# Patient Record
Sex: FEMALE | Race: WHITE
[De-identification: names, ages, dates, MRNs, and addresses within clinical notes are randomized per-mention and may not be internally consistent; named-entity substitution may affect disease eponyms.]

---

## 2017-09-05 ENCOUNTER — HOSPITAL ENCOUNTER (EMERGENCY)
Dept: HOSPITAL 74 - JER | Age: 42
LOS: 1 days | Discharge: HOME | End: 2017-09-06
Payer: COMMERCIAL

## 2017-09-05 VITALS — TEMPERATURE: 98.5 F

## 2017-09-05 VITALS — BODY MASS INDEX: 27.4 KG/M2

## 2017-09-05 DIAGNOSIS — I10: ICD-10-CM

## 2017-09-05 DIAGNOSIS — E11.65: Primary | ICD-10-CM

## 2017-09-05 DIAGNOSIS — Z79.84: ICD-10-CM

## 2017-09-05 LAB
APPEARANCE UR: CLEAR
BASOPHILS # BLD: 0.9 % (ref 0–2)
BILIRUB UR STRIP.AUTO-MCNC: NEGATIVE MG/DL
COLOR UR: (no result)
DEPRECATED RDW RBC AUTO: 12.3 % (ref 11.6–15.6)
EOSINOPHIL # BLD: 1.1 % (ref 0–4.5)
INR BLD: 0.94 (ref 0.82–1.09)
KETONES UR QL STRIP: NEGATIVE
LEUKOCYTE ESTERASE UR QL STRIP.AUTO: NEGATIVE
MCH RBC QN AUTO: 30.3 PG (ref 25.7–33.7)
MCHC RBC AUTO-ENTMCNC: 34.9 G/DL (ref 32–36)
MCV RBC: 87 FL (ref 80–96)
NEUTROPHILS # BLD: 55.5 % (ref 42.8–82.8)
NITRITE UR QL STRIP: NEGATIVE
PH UR: 7 [PH] (ref 5–8)
PLATELET # BLD AUTO: 324 K/MM3 (ref 134–434)
PMV BLD: 8.6 FL (ref 7.5–11.1)
PROT UR QL STRIP: (no result)
PROT UR QL STRIP: NEGATIVE
PT PNL PPP: 10.3 SEC (ref 9.98–11.88)
RBC # UR STRIP: NEGATIVE /UL
UROBILINOGEN UR STRIP-MCNC: NEGATIVE MG/DL (ref 0.2–1)
WBC # BLD AUTO: 11.1 K/MM3 (ref 4–10)

## 2017-09-05 PROCEDURE — 3E0337Z INTRODUCTION OF ELECTROLYTIC AND WATER BALANCE SUBSTANCE INTO PERIPHERAL VEIN, PERCUTANEOUS APPROACH: ICD-10-PCS

## 2017-09-05 NOTE — PDOC
History of Present Illness





- General


History Source: Patient


Exam Limitations: No Limitations





- History of Present Illness


Initial Comments: 


09/06/17 00:26


Patient is a 42 year old female with pmhx of diabetes who presents to the ED 

with hyperglycemia, headache and dizziness. Patient states that this morning 

she woke up with a headache and dizziness which she describes as the room 

spinning. She states that she measured her BP to be 154/109. Patient notes that 

her blood glucose was 525 at home. Patient notes that she took her medication. 

Patient reports nausea and 1 episode of vomiting at the time. She states that 

she used to be on insulin but was taken off because her insulin was regulated 

so she was placed on pills. 


PSH - knee sx


FH - both maternal and paternal uncles with DM


SH - non smoker, no alcohol use no IVDU.











<Kym Roblero - Last Filed: 09/06/17 00:26>





<Lilia Burgos - Last Filed: 09/06/17 02:45>





- General


Chief Complaint: Blood Sugar Problem


Stated Complaint: BLOOD SUGAR PROBLEM


Time Seen by Provider: 09/05/17 23:17





Past History





<Kym Roblero - Last Filed: 09/06/17 00:26>





- Past Medical History


HTN: Yes





- Psycho/Social/Smoking Cessation Hx


Suicidal Ideation: No


Smoking History: Never smoked


Have you smoked in the past 12 months: No


Information on smoking cessation initiated: No


Hx Alcohol Use: No


Drug/Substance Use Hx: No





<Lilia Burgos - Last Filed: 09/06/17 02:45>





- Past Medical History


Allergies/Adverse Reactions: 


 Allergies











Allergy/AdvReac Type Severity Reaction Status Date / Time


 


No Known Allergies Allergy   Verified 09/05/17 23:00











Home Medications: 


Ambulatory Orders





Lisinopril 5 mg PO DAILY 09/05/17 


Gemfibrozil [Lopid -] 600 mg PO BID 09/06/17 


Glipizide [Glucotrol] 20 mg PO DAILY 09/06/17 


Metformin HCl [Glucophage] 1,000 mg PO BID 09/06/17 


Ranitidine HCl [Zantac] 150 mg PO BID 09/06/17 











**Review of Systems





- Review of Systems


Able to Perform ROS?: Yes


Comments:: 


09/06/17 00:26


CONSTITUTIONAL: 


Absent: fever, chills, diaphoresis, generalized weakness, malaise, loss of 

appetite


HEENT: 


Absent: rhinorrhea, nasal congestion, throat pain, throat swelling, difficulty 

swallowing, mouth swelling, ear pain, eye pain, visual Changes


CARDIOVASCULAR: 


Absent: chest pain, syncope, palpitations, irregular heart rate, lightheadedness

, peripheral edema


RESPIRATORY: 


Absent: cough, shortness of breath, dyspnea with exertion, orthopnea, wheezing, 

stridor, hemoptysis


GASTROINTESTINAL:


Absent: abdominal pain, abdominal distension, nausea, vomiting, diarrhea, 

constipation, melena, hematochezia


GENITOURINARY: 


Absent: dysuria, frequency, urgency, hesitancy, hematuria, flank pain, genital 

pain


MUSCULOSKELETAL: 


Absent: myalgia, arthralgia, joint swelling


SKIN: 


Absent: rash, itching, pallor


HEMATOLOGIC/IMMUNOLOGIC: 


Absent: easy bleeding, easy bruising, lymphadenopathy, frequent infections


ENDOCRINE:


Absent: unexplained weight gain, unexplained weight loss, heat intolerance, 

cold intolerance


NEUROLOGIC: 


Present: headache, dizziness. 


Absent: focal weakness or paresthesias, unsteady gait, seizure, mental status 

changes, bladder or bowel incontinence


PSYCHIATRIC: 


Absent: anxiety, depression, suicidal or homicidal ideation, hallucinations.








<Kym Roblero - Last Filed: 09/06/17 00:26>





*Physical Exam





- Vital Signs


 Last Vital Signs











Temp Pulse Resp BP Pulse Ox


 


 98.5 F   84   18   125/83   98 


 


 09/05/17 22:55  09/05/17 22:55  09/05/17 22:55  09/05/17 22:55  09/05/17 22:55














- Physical Exam


Comments: 


09/06/17 00:27


GENERAL:


Well developed, well nourished. Awake and alert. No acute distress.


HEENT:


Normocephalic, atraumatic. PERRLA, EOMI. No conjunctival pallor. Sclera are non-

icteric. Moist mucous membranes. Oropharynx is clear.


NECK: Supple. Full ROM. No JVD. Carotid pulses 2+ and symmetric, without 

bruits. No thyromegaly. No lymphadenopathy.


CARDIOVASCULAR:


Regular rate and rhythm. No murmurs, rubs, or gallops. Distal pulses are 2+ and 

symmetric. 


PULMONARY: 


No evidence of respiratory distress. Lungs clear to auscultation bilaterally. 

No wheezing, rales or rhonchi.


ABDOMINAL:


Soft. Non-tender. Non-distended. No rebound or guarding. No organomegaly. 

Normoactive bowel sounds. 


MUSCULOSKELETAL 


Normal range of motion at all joints. No bony deformities or tenderness. No CVA 

tenderness.


EXTREMITIES: 


No cyanosis. No clubbing. No edema. No calf tenderness.


SKIN: Warm and dry. Normal capillary refill. No rashes. No jaundice. 


NEUROLOGICAL: Alert, awake, appropriate. Cranial nerves 2-12 intact. Gait is 

normal without ataxia.


PSYCHIATRIC: 


Cooperative. Good eye contact. Appropriate mood and affect.








<Kym Roblero - Last Filed: 09/06/17 00:26>





- Vital Signs


 Last Vital Signs











Temp Pulse Resp BP Pulse Ox


 


 98.5 F   84   18   125/83   98 


 


 09/05/17 22:55  09/05/17 22:55  09/05/17 22:55  09/05/17 22:55  09/05/17 22:55














<Lilia Burgos - Last Filed: 09/06/17 02:45>





ED Treatment Course





- LABORATORY


CBC & Chemistry Diagram: 


 09/05/17 23:30





 09/05/17 23:30





- ADDITIONAL ORDERS


Additional order review: 


 Laboratory  Results











  09/06/17 09/05/17 09/05/17





  00:10 23:30 23:30


 


INR   


 


Puncture Site  Right brachial  


 


ABG pH  7.47 H  


 


ABG pCO2 at Pt Temp  36.1  


 


ABG pO2 at Pt Temp  83.0  


 


ABG HCO3  25.7  


 


ABG O2 Sat (Measured)  98.0  


 


ABG O2 Content  16.0  


 


ABG Base Excess  2.6 H  


 


Shankar Test  Positive  


 


O2 Delivery Device  Ra  


 


Oxygen Flow Rate  No  


 


PEEP  0.0  


 


Sodium    132 L


 


Potassium    4.3


 


Chloride    95 L


 


Carbon Dioxide    31


 


Anion Gap    6 L


 


BUN    11


 


Creatinine    0.8


 


Creat Clearance w eGFR    > 60


 


Random Glucose    459 H*


 


Calcium    9.3


 


Total Bilirubin    0.5


 


AST    18


 


ALT    41


 


Alkaline Phosphatase    117


 


Total Protein    7.3


 


Albumin    3.7


 


Serum Pregnancy, Qual   Negative 


 


Urine Color   


 


Urine Appearance   


 


Urine pH   


 


Urine Protein   


 


Urine Glucose (UA)   


 


Urine Ketones   


 


Urine Blood   


 


Urine Nitrite   


 


Urine Bilirubin   


 


Urine Urobilinogen   


 


Ur Leukocyte Esterase   














  09/05/17 09/05/17





  23:30 23:30


 


INR   0.94


 


Puncture Site  


 


ABG pH  


 


ABG pCO2 at Pt Temp  


 


ABG pO2 at Pt Temp  


 


ABG HCO3  


 


ABG O2 Sat (Measured)  


 


ABG O2 Content  


 


ABG Base Excess  


 


Shankar Test  


 


O2 Delivery Device  


 


Oxygen Flow Rate  


 


PEEP  


 


Sodium  


 


Potassium  


 


Chloride  


 


Carbon Dioxide  


 


Anion Gap  


 


BUN  


 


Creatinine  


 


Creat Clearance w eGFR  


 


Random Glucose  


 


Calcium  


 


Total Bilirubin  


 


AST  


 


ALT  


 


Alkaline Phosphatase  


 


Total Protein  


 


Albumin  


 


Serum Pregnancy, Qual  


 


Urine Color  Ltyellow 


 


Urine Appearance  Clear 


 


Urine pH  7.0 


 


Urine Protein  Negative 


 


Urine Glucose (UA)  3+ H 


 


Urine Ketones  Negative 


 


Urine Blood  Negative 


 


Urine Nitrite  Negative 


 


Urine Bilirubin  Negative 


 


Urine Urobilinogen  Negative 


 


Ur Leukocyte Esterase  Negative 








 











  09/05/17





  23:30


 


RBC  4.37


 


MCV  87.0


 


MCHC  34.9


 


RDW  12.3


 


MPV  8.6


 


Neutrophils %  55.5


 


Lymphocytes %  38.3


 


Monocytes %  4.2


 


Eosinophils %  1.1


 


Basophils %  0.9














- Medications


Given in the ED: 


ED Medications














Discontinued Medications














Generic Name Dose Route Start Last Admin





  Trade Name Freq  PRN Reason Stop Dose Admin


 


Sodium Chloride  1,000 mls @ 1,000 mls/hr 09/05/17 23:17 09/05/17 23:37





  Normal Saline -  IV 09/06/17 00:16  1,000 mls/hr





  .Q1H STA   Administration














<Kym Roblero - Last Filed: 09/06/17 00:26>





- LABORATORY


CBC & Chemistry Diagram: 


 09/05/17 23:30





 09/05/17 23:30





- ADDITIONAL ORDERS


Additional order review: 


 Laboratory  Results











  09/05/17





  23:30


 


Urine Color  Ltyellow


 


Urine Appearance  Clear


 


Urine pH  7.0


 


Urine Protein  Negative


 


Urine Glucose (UA)  3+ H


 


Urine Ketones  Negative


 


Urine Blood  Negative


 


Urine Nitrite  Negative


 


Urine Bilirubin  Negative


 


Urine Urobilinogen  Negative


 


Ur Leukocyte Esterase  Negative








 











  09/05/17





  23:30


 


RBC  4.37


 


MCV  87.0


 


MCHC  34.9


 


RDW  12.3


 


MPV  8.6


 


Neutrophils %  55.5


 


Lymphocytes %  38.3


 


Monocytes %  4.2


 


Eosinophils %  1.1


 


Basophils %  0.9














<Lilia Burgos - Last Filed: 09/06/17 02:45>





Medical Decision Making





- Medical Decision Making





09/06/17 02:41


42-year-old female who is a nonsmoker diabetic for the past 5 years.  Has been 

told by her primary care physician that she needs to start insulin.  


She came in this evening because she felt lightheaded and  her glucose was 459.

  


She has no nausea or vomiting.  


She is acetone negative.  


Patient will receive 1000 mg of her glucophage and she was discharged to follow 

up with her PCP





<Lilia Burgos - Last Filed: 09/06/17 02:45>





*DC/Admit/Observation/Transfer





- Attestations


Scribe Attestion: 


09/06/17 00:27


Documentation prepared by ROMERO Edmond, acting as medical scribe for 

Lilia Burgos MD.





<Kym Roblero - Last Filed: 09/06/17 00:26>





<Lilia Burgos - Last Filed: 09/06/17 02:45>


Diagnosis at time of Disposition: 


 Hyperglycemia





Hypertension


Qualifiers:


 Hypertension type: essential hypertension Qualified Code(s): I10 - Essential (

primary) hypertension





- Discharge Dispostion


Disposition: HOME


Condition at time of disposition: Stable





- Patient Instructions


Printed Discharge Instructions:  DI for Hyperglycemia -- Adult


Additional Instructions: 


PLEASE FOLLOW UP WITH YOUR PHYSICIAN THIS WEEK

## 2017-09-06 VITALS — HEART RATE: 70 BPM | DIASTOLIC BLOOD PRESSURE: 95 MMHG | SYSTOLIC BLOOD PRESSURE: 147 MMHG

## 2017-09-06 LAB
ALBUMIN SERPL-MCNC: 3.7 G/DL (ref 3.4–5)
ALP SERPL-CCNC: 117 U/L (ref 45–117)
ALT SERPL-CCNC: 41 U/L (ref 12–78)
ANION GAP SERPL CALC-SCNC: 6 MMOL/L (ref 8–16)
ART PUNCT SITE: (no result)
ARTERIAL PATENCY WRIST A: POSITIVE
AST SERPL-CCNC: 18 U/L (ref 15–37)
BASE EXCESS BLDA CALC-SCNC: 2.6 MEQ/L (ref -2–2)
BILIRUB SERPL-MCNC: 0.5 MG/DL (ref 0.2–1)
CALCIUM SERPL-MCNC: 9.3 MG/DL (ref 8.5–10.1)
CO2 SERPL-SCNC: 31 MMOL/L (ref 21–32)
CREAT SERPL-MCNC: 0.8 MG/DL (ref 0.55–1.02)
GLUCOSE SERPL-MCNC: 459 MG/DL (ref 74–106)
HCO3 BLDA-SCNC: 25.7 MEQ/L (ref 22–26)
PEEP SETTING VENT: 0 CMH2O
PO2 BLDA: 83 MMHG (ref 80–100)
PROT SERPL-MCNC: 7.3 G/DL (ref 6.4–8.2)
PT. ON O2?: NO
SAO2 % BLDA: 98 % (ref 90–98.9)
SP GR UR: 1.01 (ref 1–1.02)
TYPE OF O2: (no result)

## 2018-05-30 ENCOUNTER — HOSPITAL ENCOUNTER (EMERGENCY)
Dept: HOSPITAL 74 - JERFT | Age: 43
Discharge: HOME | End: 2018-05-30
Payer: COMMERCIAL

## 2018-05-30 VITALS — BODY MASS INDEX: 26.6 KG/M2

## 2018-05-30 VITALS — HEART RATE: 94 BPM | TEMPERATURE: 98.5 F

## 2018-05-30 VITALS — DIASTOLIC BLOOD PRESSURE: 81 MMHG | SYSTOLIC BLOOD PRESSURE: 156 MMHG

## 2018-05-30 DIAGNOSIS — I10: ICD-10-CM

## 2018-05-30 DIAGNOSIS — M43.6: Primary | ICD-10-CM

## 2018-05-30 PROCEDURE — 3E0233Z INTRODUCTION OF ANTI-INFLAMMATORY INTO MUSCLE, PERCUTANEOUS APPROACH: ICD-10-PCS | Performed by: NURSE PRACTITIONER

## 2018-05-30 NOTE — PDOC
History of Present Illness





- General


Chief Complaint: Back Pain


Stated Complaint: BACK PAIN


Time Seen by Provider: 05/30/18 14:25


History Source: Patient


Exam Limitations: No Limitations





- History of Present Illness


Initial Comments: 





05/30/18 15:12


43-year-old woman without significant past medical history presents emergency 

Department with right lateral neck pain for 2 days after waking up. Patient is 

been taking over-the-counter medications with limited relief of pain. Patient 

denies any trauma, chiropractic care or massages.





Past History





- Past Medical History


Allergies/Adverse Reactions: 


 Allergies











Allergy/AdvReac Type Severity Reaction Status Date / Time


 


No Known Allergies Allergy   Verified 05/30/18 14:26











Home Medications: 


Ambulatory Orders





Cyclobenzaprine HCl [Flexeril 10 mg] 10 mg PO BID PRN #30 tablet 05/30/18 


Insulin Glargine,Hum.rec.anlog [Lantus] 40 unit SQ ASDIR 05/30/18 








HTN: Yes





- Suicide/Smoking/Psychosocial Hx


Smoking History: Never smoked


Have you smoked in the past 12 months: No


Hx Alcohol Use: No


Drug/Substance Use Hx: No





**Review of Systems





- Review of Systems


Able to Perform ROS?: Yes


Is the patient limited English proficient: No


Constitutional: No: Symptoms Reported


HEENTM: Yes: See HPI


Respiratory: No: Symptoms reported


Cardiac (ROS): No: Symptoms Reported


ABD/GI: No: Symptoms Reported


: No: Symptoms Reported


Musculoskeletal: No: Symptoms Reported


Integumentary: No: Symptoms Reported


Neurological: No: Symptoms reported





*Physical Exam





- Vital Signs


 Last Vital Signs











Temp Pulse Resp BP Pulse Ox


 


 98.5 F   94 H  20   156/81   100 


 


 05/30/18 14:23  05/30/18 14:23  05/30/18 14:23  05/30/18 14:23  05/30/18 14:23














- Physical Exam


General Appearance: Yes: Appropriately Dressed.  No: Apparent Distress


Neck: positive: Decreased range of motion (decreased right rotational ROM 2/2 

pain), Tender lateral (right lateral muscle spasm of SCM)


Respiratory/Chest: positive: Lungs Clear, Normal Breath Sounds.  negative: 

Respiratory Distress, Accessory Muscle Use


Neurologic: positive: CNs II-XII NML intact, Fully Oriented, Alert, Normal Mood/

Affect, Normal Response, Motor Strength 5/5





Medical Decision Making





- Medical Decision Making





05/30/18 15:15


A/P:


43-year-old woman without medical history with right sided neck pain upon 

awakening 2 days ago





Muscle spasm noted to the right sternocleidomastoid


Decreased range of motion of the cervical spine with right lateral rotation


Able to flex and extend cervical spine without difficulty


No numbness or tingling to upper extremities


 strength 5/5 bilaterally





Urine, Toradol, Flexeril, reassess


05/30/18 16:48


Patient reports complete relief of pain after receiving Flexeril and Toradol 

injection. Patient was observed speaking on the phone laughing and talking with 

her family. Patient is requesting discharge at this time. I will give the 

patient prescription for Flexeril and patient verbalized understanding of NSAID 

use to control pain.





*DC/Admit/Observation/Transfer


Diagnosis at time of Disposition: 


 Torticollis, acquired








- Discharge Dispostion


Disposition: HOME


Condition at time of disposition: Stable





- Prescriptions


Prescriptions: 


Cyclobenzaprine HCl [Flexeril 10 mg] 10 mg PO BID PRN #30 tablet


 PRN Reason: Muscle Spasms





- Referrals





- Patient Instructions


Additional Instructions: 


Rest, no heavy lifting or exercise until pain is resolved


Hot soaks to neck and low back as often as possible/hot showers or Jacuzzis


No massage or therapy until spasm is gone





Continue ibuprofen 2-200 mg tablets every 6 hours for the next 3 days then as 

needed for pain and swelling


flexeril 10 mg every 8 hours as needed for spasm


If not significant improvement within 24 hours with medication and rest regime, 

followup with private physician for change in medications and /or therapy.








- Post Discharge Activity


Forms/Work/School Notes:  Back to Work

## 2018-05-30 NOTE — PDOC
Rapid Medical Evaluation


Time Seen by Provider: 05/30/18 14:25


Medical Evaluation: 


 Allergies











Allergy/AdvReac Type Severity Reaction Status Date / Time


 


No Known Allergies Allergy   Verified 09/05/17 23:00











05/30/18 14:25


I have performed a brief in-person evaluation of the patient. 





The patient presents with a chief complaint of :


neck and back pain since yesterday 


Denies injury or heavy lifting





Pertinent physical exam findings.


NAD


right side of neck tender unable to completely turn to right side 


no mid cervical spine tenderness





I have ordered the following


analgesia, urine pregnancy


This patient will proceed to the ED for further evaluation.

## 2019-08-15 ENCOUNTER — HOSPITAL ENCOUNTER (EMERGENCY)
Dept: HOSPITAL 74 - JER | Age: 44
Discharge: HOME | End: 2019-08-15
Payer: COMMERCIAL